# Patient Record
Sex: FEMALE | Race: WHITE | HISPANIC OR LATINO | URBAN - METROPOLITAN AREA
[De-identification: names, ages, dates, MRNs, and addresses within clinical notes are randomized per-mention and may not be internally consistent; named-entity substitution may affect disease eponyms.]

---

## 2018-01-04 ENCOUNTER — EMERGENCY (EMERGENCY)
Facility: HOSPITAL | Age: 23
LOS: 1 days | Discharge: HOME | End: 2018-01-04
Attending: EMERGENCY MEDICINE
Payer: MEDICAID

## 2018-01-04 VITALS
HEIGHT: 63 IN | SYSTOLIC BLOOD PRESSURE: 127 MMHG | RESPIRATION RATE: 18 BRPM | DIASTOLIC BLOOD PRESSURE: 79 MMHG | WEIGHT: 130.07 LBS | OXYGEN SATURATION: 100 % | TEMPERATURE: 99 F | HEART RATE: 86 BPM

## 2018-01-04 PROCEDURE — 99284 EMERGENCY DEPT VISIT MOD MDM: CPT | Mod: 25

## 2018-01-04 PROCEDURE — 99284 EMERGENCY DEPT VISIT MOD MDM: CPT

## 2018-01-04 PROCEDURE — 73630 X-RAY EXAM OF FOOT: CPT | Mod: 26,RT

## 2018-01-04 PROCEDURE — 73630 X-RAY EXAM OF FOOT: CPT

## 2018-01-04 PROCEDURE — 73610 X-RAY EXAM OF ANKLE: CPT | Mod: 26,RT

## 2018-01-04 PROCEDURE — 73610 X-RAY EXAM OF ANKLE: CPT

## 2018-01-04 RX ORDER — IBUPROFEN 200 MG
800 TABLET ORAL ONCE
Qty: 0 | Refills: 0 | Status: COMPLETED | OUTPATIENT
Start: 2018-01-04 | End: 2018-01-04

## 2018-01-04 RX ADMIN — Medication 800 MILLIGRAM(S): at 18:04

## 2018-01-04 NOTE — ED ADULT TRIAGE NOTE - CHIEF COMPLAINT QUOTE
patient arrived via EMS, awake, alert, and oriented times 3, breathing unlabored.  patient fell while trying to hang something up.  No LOC.  Patient did not hit head. patient complaining of right ankle pain.  Swelling and bruising noted to right ankle

## 2018-01-04 NOTE — ED STATDOCS - ATTENDING CONTRIBUTION TO CARE
I, Kash Mays, performed the initial face to face bedside interview with this patient regarding history of present illness, review of symptoms and relevant past medical, social and family history.  I completed an independent physical examination.  I was the initial provider who evaluated this patient. I have signed out the follow up of any pending tests (i.e. labs, radiological studies) to the ACP.  I have communicated the patient’s plan of care and disposition with the ACP.

## 2018-01-04 NOTE — ED STATDOCS - OBJECTIVE STATEMENT
21 y/o F pt with no pertinent pmhx BIBA to the ED c/o R ankle pain and R foot pain s/p fall and inversion injury that onset today. She explains that she was standing on a chair when she fell and inverted her ankle inwards. She did NOT hit her head during the fall. He pain radiates up her R leg. Since the event she has not ambulated. NKDA. No SHx. Not taking medications at this time. States that she cannot be pregnant. Non smoker, non drinker, no illicit drug. Rates her pain 10/10. Denies LOC, n/v/d, numbness, tingling, headache, calf pain, chest pain, sob, blurred vision, n/v/d, or any other complaints.

## 2018-01-04 NOTE — ED STATDOCS - PROGRESS NOTE DETAILS
PA NOTE: patient re-evaluated s/p twisting ankle, PEL +soft tissue swelling to lateral malleous, no echymosis, DP and PT pulses intact, compartments soft NT ND, +motor and sensory sensation intact.  XR reviewed.  Likely ankle sprain, recommend rest, ice, elevation, f/u with ortho, NSAIDS PRN.  Patient verbalized understanding and agrees to proceed.

## 2018-01-04 NOTE — ED STATDOCS - DIAGNOSIS COUNSELING, MDM
conducted a detailed discussion... R ankle foot pain/swelling, X-ray for ramonita injury, pain meds, check x-ray and reassess.

## 2018-01-04 NOTE — ED STATDOCS - MUSCULOSKELETAL RIGHT ANKLE APPEARANCE, MLM
TENDER TO THE R LATERAL MALLEOLUS. SLIGHT TENDERNESS TO THE MEDIAL MALLEOULUS. TENDERNESS TO THE RPXIMAL R 5TH METATARSAL./SWELLING

## 2018-01-09 ENCOUNTER — APPOINTMENT (OUTPATIENT)
Dept: ORTHOPEDIC SURGERY | Facility: CLINIC | Age: 23
End: 2018-01-09
Payer: COMMERCIAL

## 2018-01-09 VITALS
BODY MASS INDEX: 24.45 KG/M2 | DIASTOLIC BLOOD PRESSURE: 77 MMHG | SYSTOLIC BLOOD PRESSURE: 122 MMHG | HEIGHT: 63 IN | HEART RATE: 108 BPM | WEIGHT: 138 LBS

## 2018-01-09 DIAGNOSIS — S93.401A SPRAIN OF UNSPECIFIED LIGAMENT OF RIGHT ANKLE, INITIAL ENCOUNTER: ICD-10-CM

## 2018-01-09 PROBLEM — Z00.00 ENCOUNTER FOR PREVENTIVE HEALTH EXAMINATION: Status: ACTIVE | Noted: 2018-01-09

## 2018-01-09 PROCEDURE — 73610 X-RAY EXAM OF ANKLE: CPT | Mod: RT

## 2018-01-09 PROCEDURE — 99203 OFFICE O/P NEW LOW 30 MIN: CPT

## 2018-01-09 RX ORDER — OXYCODONE AND ACETAMINOPHEN 5; 325 MG/1; MG/1
5-325 TABLET ORAL
Qty: 15 | Refills: 0 | Status: ACTIVE | COMMUNITY
Start: 2018-01-05

## 2021-05-29 ENCOUNTER — EMERGENCY (EMERGENCY)
Facility: HOSPITAL | Age: 26
LOS: 1 days | End: 2021-05-29
Payer: MEDICAID

## 2021-05-29 VITALS — HEIGHT: 63 IN | WEIGHT: 134.92 LBS

## 2021-05-29 VITALS
TEMPERATURE: 99 F | RESPIRATION RATE: 18 BRPM | HEART RATE: 86 BPM | SYSTOLIC BLOOD PRESSURE: 120 MMHG | DIASTOLIC BLOOD PRESSURE: 78 MMHG | OXYGEN SATURATION: 100 %

## 2021-05-29 PROCEDURE — U0003: CPT

## 2021-05-29 PROCEDURE — U0005: CPT

## 2021-05-29 PROCEDURE — 99282 EMERGENCY DEPT VISIT SF MDM: CPT

## 2021-05-29 PROCEDURE — 99283 EMERGENCY DEPT VISIT LOW MDM: CPT

## 2021-05-29 NOTE — ED PROVIDER NOTE - PATIENT PORTAL LINK FT
You can access the FollowMyHealth Patient Portal offered by [facility] by registering at the following website: [email or web address]. By joining [facility]’s FollowMyHealth portal, you will also be able to view your health information using other applications (apps) compatible with our system.

## 2021-05-29 NOTE — ED PROVIDER NOTE - CLINICAL SUMMARY MEDICAL DECISION MAKING FREE TEXT BOX
Pt presenting for COVID testing  -Pt does not meet current COVID-19 criteria listed in most updated guidelines as per [facility] protocol/algorithm for admission at this time   -Lengthy discussion with patient regarding home quarantine, follow up with PMD, anticipatory guidance provided and supportive care recommended. Advised immediate return if worsening symptoms, strict return precautions, especially if short of breath, chest pain, inability to tolerate food/liquid. Pt given pre-printed [facility] Novel Coronavirus (COVID19) information packet which contains instructions on time frame of result and how to obtain. Pt verbalized understanding and agreement of plan.

## 2021-05-30 LAB — SARS-COV-2 RNA SPEC QL NAA+PROBE: SIGNIFICANT CHANGE UP

## 2021-09-11 ENCOUNTER — EMERGENCY (EMERGENCY)
Facility: HOSPITAL | Age: 26
LOS: 1 days | Discharge: HOME | End: 2021-09-11
Payer: MEDICAID

## 2021-09-11 ENCOUNTER — TRANSCRIPTION ENCOUNTER (OUTPATIENT)
Age: 26
End: 2021-09-11

## 2021-09-11 VITALS
DIASTOLIC BLOOD PRESSURE: 77 MMHG | RESPIRATION RATE: 16 BRPM | HEART RATE: 85 BPM | HEIGHT: 63 IN | SYSTOLIC BLOOD PRESSURE: 123 MMHG | OXYGEN SATURATION: 97 % | TEMPERATURE: 99 F

## 2021-09-11 LAB — SARS-COV-2 RNA SPEC QL NAA+PROBE: SIGNIFICANT CHANGE UP

## 2021-09-11 PROCEDURE — 99283 EMERGENCY DEPT VISIT LOW MDM: CPT

## 2021-09-11 PROCEDURE — U0003: CPT

## 2021-09-11 PROCEDURE — U0005: CPT

## 2021-09-11 NOTE — ED PROVIDER NOTE - CLINICAL SUMMARY MEDICAL DECISION MAKING FREE TEXT BOX
Pt presenting for COVID testing  -Pt does not meet current COVID-19 criteria listed in most updated guidelines as per [facility] protocol/algorithm for admission at this time   -Lengthy discussion with patient regarding home quarantine, follow up with PMD, anticipatory guidance provided and supportive care recommended. Advised immediate return if worsening symptoms, strict return precautions, especially if short of breath, chest pain, inability to tolerate food/liquid. Pt given pre-printed [facility] Novel Coronavirus (COVID19) information packet which contains instructions on time frame of result and how to obtain. Pt verbalized understanding and agreement of plan.

## 2021-09-11 NOTE — ED PROVIDER NOTE - PATIENT PORTAL LINK FT
You can access the FollowMyHealth Patient Portal offered by [facility] by registering at the following website: [email or web address]. By joining [employer]’s FollowMyHealth portal, you will also be able to view your health information using other applications (apps) compatible with our system.

## 2022-12-11 NOTE — ED STATDOCS - FALL HARM RISK
[facility] EMERGENCY DEPARTMENT      CHIEF COMPLAINT  Back Pain (C/o lower back pain x 2 days. Denies any known injury. C/o chills. Denies urinary symptoms. )       HISTORY OF PRESENT ILLNESS  Jesusita Moralez Asp is a 25 y.o. female  who presents to the ED complaining of low back pain and now low abdominal pain and pelvic pain. Patient states almost feels like she is on her menstrual cycle but she does not have any vaginal bleeding or any vaginal discharge. Her LMP was 3 weeks ago. She denies any dysuria or hematuria but has been frequently urinating. She has been nauseated but no vomiting. No known fevers but she has been having chills at times. She states that is more of a dull cramping type of discomfort. She tried to take some omeprazole overnight without relief. No other complaints, modifying factors or associated symptoms. I have reviewed the following from the nursing documentation. Past Medical History:   Diagnosis Date    Anxiety     Depression     Hypothyroidism     Migraine     Overactive bladder      Past Surgical History:   Procedure Laterality Date    WISDOM TOOTH EXTRACTION       History reviewed. No pertinent family history.   Social History     Socioeconomic History    Marital status: Single     Spouse name: Not on file    Number of children: Not on file    Years of education: Not on file    Highest education level: Not on file   Occupational History    Not on file   Tobacco Use    Smoking status: Never    Smokeless tobacco: Never   Substance and Sexual Activity    Alcohol use: Yes     Comment: occasionally    Drug use: Never    Sexual activity: Not on file   Other Topics Concern    Not on file   Social History Narrative    Not on file     Social Determinants of Health     Financial Resource Strain: Not on file   Food Insecurity: Not on file   Transportation Needs: Not on file   Physical Activity: Not on file   Stress: Not on file   Social Connections: Not on file   Intimate Partner Violence: Not on file   Housing Stability: Not on file     Current Facility-Administered Medications   Medication Dose Route Frequency Provider Last Rate Last Admin    ketorolac (TORADOL) injection 30 mg  30 mg IntraVENous Once Parth William MD        ondansetron [facility]) injection 4 mg  4 mg IntraVENous Q1H PRN Parth William MD         Current Outpatient Medications   Medication Sig Dispense Refill    levothyroxine (SYNTHROID) 75 MCG tablet Take 75 mcg by mouth every morning (before breakfast)      propranolol (INDERAL LA) 60 MG extended release capsule Take 120 mg by mouth daily      sertraline (ZOLOFT) 100 MG tablet Take 200 mg by mouth daily      cefUROXime (CEFTIN) 250 MG tablet Take 1 tablet by mouth 2 times daily for 10 days 20 tablet 0    buPROPion (WELLBUTRIN XL) 150 MG extended release tablet Take 150 mg by mouth daily      norgestimate-ethinyl estradiol (ORTHO-CYCLEN) 0.25-35 MG-MCG per tablet Take 1 tablet by mouth daily      ibuprofen (ADVIL;MOTRIN) 600 MG tablet Take 600 mg by mouth every 6 hours as needed       No Known Allergies    REVIEW OF SYSTEMS  10 systems reviewed, pertinent positives per HPI otherwise noted to be negative. PHYSICAL EXAM  BP (!) 153/93   Pulse 71   Temp 98.2 °F (36.8 °C) (Oral)   Resp 18   LMP 11/20/2022 (Approximate)   SpO2 98%    GENERAL APPEARANCE: Awake and alert. Cooperative. No acute distress. HENT: Normocephalic. Atraumatic. Mucous membranes are moist.  No drooling or stridor. NECK: Supple. EYES: PERRL. EOM's grossly intact. HEART/CHEST: RRR. No murmurs. LUNGS: Respirations unlabored. CTAB. Good air exchange. Speaking comfortably in full sentences. ABDOMEN: Mild diffuse lower abdominal tenderness. No upper abdominal pain. Soft. Non-distended. No masses. No organomegaly. No guarding or rebound. No CVA tenderness bilaterally. MUSCULOSKELETAL: No extremity edema. Compartments soft. No deformity. No tenderness in the extremities.   All extremities neurovascularly intact. No central thoracic or lumbar bony point tenderness. There is mild tenderness in the lower thoracic and upper lumbar paraspinal area bilaterally without any rashes or ecchymoses. SKIN: Warm and dry. No acute rashes. NEUROLOGICAL: Alert and oriented. CN's 2-12 intact. No gross facial drooping. Strength 5/5, sensation intact. 2 plus DTR's in no gross focal deficits. Bilaterally. Gait normal.  PSYCHIATRIC: Normal mood and affect. LABS  I have reviewed all labs for this visit.    Results for orders placed or performed during the hospital encounter of 12/11/22   Pregnancy, urine   Result Value Ref Range    HCG(Urine) Pregnancy Test Negative Detects HCG level >20 MIU/mL   Urinalysis with Reflex to Culture    Specimen: Urine, clean catch   Result Value Ref Range    Color, UA Yellow Straw/Yellow    Clarity, UA SL CLOUDY (A) Clear    Glucose, Ur Negative Negative mg/dL    Bilirubin Urine Negative Negative    Ketones, Urine Negative Negative mg/dL    Specific Gravity, UA >=1.030 1.005 - 1.030    Blood, Urine LARGE (A) Negative    pH, UA 6.0 5.0 - 8.0    Protein,  (A) Negative mg/dL    Urobilinogen, Urine 0.2 <2.0 E.U./dL    Nitrite, Urine Negative Negative    Leukocyte Esterase, Urine TRACE (A) Negative    Microscopic Examination YES     Urine Type NotGiven     Urine Reflex to Culture Yes    Microscopic Urinalysis   Result Value Ref Range    WBC, UA 21-50 (A) 0 - 5 /HPF    RBC, UA 21-50 (A) 0 - 4 /HPF    Epithelial Cells, UA 21-50 (A) 0 - 5 /HPF    Bacteria, UA 3+ (A) None Seen /HPF   CBC with Auto Differential   Result Value Ref Range    WBC 12.3 (H) 4.0 - 11.0 K/uL    RBC 4.53 4.00 - 5.20 M/uL    Hemoglobin 12.1 12.0 - 16.0 g/dL    Hematocrit 35.7 (L) 36.0 - 48.0 %    MCV 78.8 (L) 80.0 - 100.0 fL    MCH 26.6 26.0 - 34.0 pg    MCHC 33.8 31.0 - 36.0 g/dL    RDW 14.3 12.4 - 15.4 %    Platelets 684 920 - 100 K/uL    MPV 7.1 5.0 - 10.5 fL    Neutrophils % 71.9 %    Lymphocytes % 20.2 % Monocytes % 5.7 %    Eosinophils % 1.6 %    Basophils % 0.6 %    Neutrophils Absolute 8.9 (H) 1.7 - 7.7 K/uL    Lymphocytes Absolute 2.5 1.0 - 5.1 K/uL    Monocytes Absolute 0.7 0.0 - 1.3 K/uL    Eosinophils Absolute 0.2 0.0 - 0.6 K/uL    Basophils Absolute 0.1 0.0 - 0.2 K/uL   CMP w/ Reflex to MG   Result Value Ref Range    Sodium 141 136 - 145 mmol/L    Potassium reflex Magnesium 3.9 3.5 - 5.1 mmol/L    Chloride 106 99 - 110 mmol/L    CO2 25 21 - 32 mmol/L    Anion Gap 10 3 - 16    Glucose 94 70 - 99 mg/dL    BUN 15 7 - 20 mg/dL    Creatinine 0.7 0.6 - 1.1 mg/dL    Est, Glom Filt Rate >60 >60    Calcium 9.3 8.3 - 10.6 mg/dL    Total Protein 6.9 6.4 - 8.2 g/dL    Albumin 3.7 3.4 - 5.0 g/dL    Albumin/Globulin Ratio 1.2 1.1 - 2.2    Total Bilirubin <0.2 0.0 - 1.0 mg/dL    Alkaline Phosphatase 54 40 - 129 U/L    ALT 6 (L) 10 - 40 U/L    AST 14 (L) 15 - 37 U/L       RADIOLOGY  CT ABDOMEN PELVIS W IV CONTRAST Additional Contrast? None    Result Date: 12/11/2022  EXAMINATION: CT OF THE ABDOMEN AND PELVIS WITH CONTRAST 12/11/2022 8:30 am TECHNIQUE: CT of the abdomen and pelvis was performed with the administration of intravenous contrast. Multiplanar reformatted images are provided for review. Automated exposure control, iterative reconstruction, and/or weight based adjustment of the mA/kV was utilized to reduce the radiation dose to as low as reasonably achievable. COMPARISON: None. HISTORY: ORDERING SYSTEM PROVIDED HISTORY: low back/aabd pain TECHNOLOGIST PROVIDED HISTORY: Additional Contrast?->None Reason for exam:->low back/aabd pain Decision Support Exception - unselect if not a suspected or confirmed emergency medical condition->Emergency Medical Condition (MA) Reason for Exam: Low back pain, abdominal pain FINDINGS: Lower Chest: The lung bases are clear. Organs: The liver, spleen, pancreas, adrenal glands and kidneys are unremarkable. The liver is enlarged measuring 24 cm in length.   There is mild right periureteral inflammation without evidence of a radiopaque nephrolithiasis. GI/Bowel: There is no bowel obstruction. The appendix is within normal limits. Scattered diverticula involve the ascending colon without adjacent inflammation. Pelvis: The pelvic viscera are within normal limits. Peritoneum/Retroperitoneum: There is no evidence of free fluid or adenopathy. Bones/Soft Tissues: Degenerative changes involve the thoracic spine and sacroiliac joints. 1. Mild right periureteral inflammation could be due to urinary tract infection versus recently passed nephrolithiasis; correlate with urinalysis. ED COURSE/MDM  Patient seen and evaluated. Old records reviewed. Labs and imaging reviewed and results discussed with patient. Patient presenting with back pain and lower abdominal cramping discomfort. Urine pregnancy negative. Urinalysis with significant epithelial cells but also with significant evidence of possible infection with white blood cells noted on microscopic exam as well as bacteria. Given the constellation of symptoms, I feel that this favors a urinary tract infection with possible ascending infection given the right periureteral inflammation noted on CT scan. No evidence of acute appendicitis or other abnormality on CT. At this time I felt that is reasonable to discharge patient home with close outpatient follow-up. Patient as well as her mother are very much in agreement of that plan. Reasons to return to the ER sooner were discussed and all questions were answered at time of discharge. I, Dr. Helen Thorpe MD, am the primary clinician of record. Is this patient to be included in the SEP-1 Core Measure?   No   Exclusion criteria - the patient is NOT to be included for SEP-1 Core Measure due to:  2+ SIRS criteria are not met     During the patient's ED course, the patient was given:  Medications   ketorolac (TORADOL) injection 30 mg (has no administration in time range) ondansetron (ZOFRAN) injection 4 mg (has no administration in time range)   iopamidol (ISOVUE-370) 76 % injection 75 mL (75 mLs IntraVENous Given 12/11/22 0839)        CLINICAL IMPRESSION  1. Acute UTI (urinary tract infection)        Blood pressure (!) 153/93, pulse 71, temperature 98.2 °F (36.8 °C), temperature source Oral, resp. rate 18, last menstrual period 11/20/2022, SpO2 98 %. Keon Valencia was discharged to home  in stable condition. Patient was given scripts for the following medications. I counseled patient how to take these medications. New Prescriptions    CEFUROXIME (CEFTIN) 250 MG TABLET    Take 1 tablet by mouth 2 times daily for 10 days       Follow-up with: Melissa Bloom MD  [address] [facility]  [address]  [phone]    Schedule an appointment as soon as possible for a visit in 2 days  For recheck    DISCLAIMER: This chart was created using Dragon dictation software. Efforts were made by me to ensure accuracy, however some errors may be present due to limitations of this technology and occasionally words are not transcribed correctly.        Maite Montoya MD  12/11/22 5918 fall today/other